# Patient Record
Sex: FEMALE | Race: ASIAN | Employment: FULL TIME | URBAN - METROPOLITAN AREA
[De-identification: names, ages, dates, MRNs, and addresses within clinical notes are randomized per-mention and may not be internally consistent; named-entity substitution may affect disease eponyms.]

---

## 2017-01-31 ENCOUNTER — OFFICE VISIT (OUTPATIENT)
Dept: FAMILY MEDICINE CLINIC | Facility: CLINIC | Age: 47
End: 2017-01-31

## 2017-01-31 VITALS
DIASTOLIC BLOOD PRESSURE: 78 MMHG | HEIGHT: 58.5 IN | OXYGEN SATURATION: 98 % | BODY MASS INDEX: 25.34 KG/M2 | TEMPERATURE: 98 F | HEART RATE: 75 BPM | SYSTOLIC BLOOD PRESSURE: 110 MMHG | RESPIRATION RATE: 16 BRPM | WEIGHT: 124 LBS

## 2017-01-31 DIAGNOSIS — G56.03 BILATERAL CARPAL TUNNEL SYNDROME: Primary | ICD-10-CM

## 2017-01-31 PROBLEM — E55.9 VITAMIN D DEFICIENCY: Status: ACTIVE | Noted: 2017-01-31

## 2017-01-31 PROBLEM — E78.5 HYPERLIPIDEMIA, UNSPECIFIED: Status: ACTIVE | Noted: 2017-01-31

## 2017-01-31 PROCEDURE — 99203 OFFICE O/P NEW LOW 30 MIN: CPT | Performed by: FAMILY MEDICINE

## 2017-01-31 RX ORDER — IBUPROFEN SODIUM 256 MG/1
400 TABLET, COATED ORAL EVERY 6 HOURS PRN
COMMUNITY

## 2017-01-31 RX ORDER — EVEROLIMUS 0.25 MG/1
TABLET ORAL
COMMUNITY

## 2017-01-31 NOTE — PROGRESS NOTES
[facility] Family Medicine Office Note  Chief Complaint:   Patient presents with:  Wrist Pain: rt wrist x 1.5 mos, intermittent, pins and needles b/l past 3 mos, right more than left, wonders if carpal tunnel      HPI:   This is a 50year old fem palpitations, edema, dyspnea on exertion or at rest.  RESPIRATORY:  Denies shortness of breath, wheezing, cough or sputum. GASTROINTESTINAL:  Denies abdominal pain, nausea, vomiting, constipation, diarrhea, or blood in stool.   MUSCULOSKELETAL:  Denies wea hepatosplenomegaly. BACK: No tenderness, no spasm. EXTREMITIES:  No edema, no cyanosis, no clubbing, FROM, 2+ dorsalis pedis pulses bilaterally, no tenderness on shoulders and elbows, mild tenderness both wrist, right more than left.   NEURO:  Normal gait

## 2017-01-31 NOTE — PATIENT INSTRUCTIONS
Understanding Carpal Tunnel Syndrome    The carpal tunnel is a narrow space inside the wrist. It is ringed by bone and a band of tough tissue called the transverse carpal ligament.  A major nerve called the median nerve runs from the forearm into the hand · Cortisone shots. Cortisone is a medicine that helps reduce swelling. It is injected directly into the wrist. It helps shrink tissues inside the carpal tunnel. This relieves symptoms for a time. · Pain medicines.  You may take over-the-counter or prescrip

## 2017-02-01 ENCOUNTER — TELEPHONE (OUTPATIENT)
Dept: FAMILY MEDICINE CLINIC | Facility: CLINIC | Age: 47
End: 2017-02-01

## 2017-02-01 NOTE — TELEPHONE ENCOUNTER
Pt saw Dr. Carmen Chamorro on 1/31/17 for carpal tunnel. She picked up a splint and is wondering if she pickup up the correct one. Please advise patient at [phone]. Thank you.

## 2017-02-04 ENCOUNTER — LAB ENCOUNTER (OUTPATIENT)
Dept: LAB | Age: 47
End: 2017-02-04
Attending: FAMILY MEDICINE
Payer: COMMERCIAL

## 2017-02-04 ENCOUNTER — OFFICE VISIT (OUTPATIENT)
Dept: FAMILY MEDICINE CLINIC | Facility: CLINIC | Age: 47
End: 2017-02-04

## 2017-02-04 VITALS
HEIGHT: 58.5 IN | HEART RATE: 77 BPM | RESPIRATION RATE: 16 BRPM | TEMPERATURE: 98 F | SYSTOLIC BLOOD PRESSURE: 100 MMHG | OXYGEN SATURATION: 99 % | BODY MASS INDEX: 25.54 KG/M2 | WEIGHT: 125 LBS | DIASTOLIC BLOOD PRESSURE: 66 MMHG

## 2017-02-04 DIAGNOSIS — Z13.1 SCREENING FOR DIABETES MELLITUS: ICD-10-CM

## 2017-02-04 DIAGNOSIS — Z12.4 SCREENING FOR CERVICAL CANCER: ICD-10-CM

## 2017-02-04 DIAGNOSIS — R74.8 ELEVATED LIVER ENZYMES: ICD-10-CM

## 2017-02-04 DIAGNOSIS — E55.9 VITAMIN D DEFICIENCY: ICD-10-CM

## 2017-02-04 DIAGNOSIS — Z00.01 ENCOUNTER FOR GENERAL ADULT MEDICAL EXAMINATION WITH ABNORMAL FINDINGS: ICD-10-CM

## 2017-02-04 DIAGNOSIS — R79.89 ELEVATED FERRITIN LEVEL: ICD-10-CM

## 2017-02-04 DIAGNOSIS — E78.00 PURE HYPERCHOLESTEROLEMIA: ICD-10-CM

## 2017-02-04 DIAGNOSIS — N91.2 ABSENCE OF MENSTRUATION: ICD-10-CM

## 2017-02-04 DIAGNOSIS — Z12.31 ENCOUNTER FOR SCREENING MAMMOGRAM FOR BREAST CANCER: ICD-10-CM

## 2017-02-04 DIAGNOSIS — Z00.01 ENCOUNTER FOR GENERAL ADULT MEDICAL EXAMINATION WITH ABNORMAL FINDINGS: Primary | ICD-10-CM

## 2017-02-04 DIAGNOSIS — K76.0 FATTY LIVER: ICD-10-CM

## 2017-02-04 DIAGNOSIS — Z13.0 SCREENING FOR DEFICIENCY ANEMIA: ICD-10-CM

## 2017-02-04 DIAGNOSIS — Z13.29 SCREENING FOR THYROID DISORDER: ICD-10-CM

## 2017-02-04 DIAGNOSIS — Z13.89 SCREENING FOR GENITOURINARY CONDITION: ICD-10-CM

## 2017-02-04 DIAGNOSIS — Z12.11 SCREENING FOR COLON CANCER: ICD-10-CM

## 2017-02-04 DIAGNOSIS — Z80.0 FAMILY HISTORY OF COLON CANCER: ICD-10-CM

## 2017-02-04 DIAGNOSIS — G56.03 BILATERAL CARPAL TUNNEL SYNDROME: ICD-10-CM

## 2017-02-04 LAB
25-HYDROXYVITAMIN D (TOTAL): 16.2 NG/ML (ref 30–100)
ALBUMIN SERPL-MCNC: 4.2 G/DL (ref 3.5–4.8)
ALP LIVER SERPL-CCNC: 89 U/L (ref 39–100)
ALT SERPL-CCNC: 64 U/L (ref 14–54)
APPEARANCE: CLEAR
AST SERPL-CCNC: 37 U/L (ref 15–41)
BASOPHILS # BLD AUTO: 0.05 X10(3) UL (ref 0–0.1)
BASOPHILS NFR BLD AUTO: 0.9 %
BILIRUB SERPL-MCNC: 0.7 MG/DL (ref 0.1–2)
BILIRUBIN: NEGATIVE
BUN BLD-MCNC: 10 MG/DL (ref 8–20)
CALCIUM BLD-MCNC: 8.7 MG/DL (ref 8.3–10.3)
CHLORIDE: 102 MMOL/L (ref 101–111)
CHOLEST SMN-MCNC: 248 MG/DL (ref ?–200)
CO2: 32 MMOL/L (ref 22–32)
CONTROL LINE PRESENT WITH A CLEAR BACKGROUND (YES/NO): YES YES/NO
CREAT BLD-MCNC: 0.7 MG/DL (ref 0.55–1.02)
DEPRECATED HBV CORE AB SER IA-ACNC: 320.3 NG/ML (ref 10–291)
EOSINOPHIL # BLD AUTO: 0.12 X10(3) UL (ref 0–0.3)
EOSINOPHIL NFR BLD AUTO: 2.2 %
ERYTHROCYTE [DISTWIDTH] IN BLOOD BY AUTOMATED COUNT: 11.8 % (ref 11.5–16)
GAMMA GLUTAMYL TRANSFERASE: 76 U/L (ref 5–55)
GLUCOSE (URINE DIPSTICK): NEGATIVE MG/DL
GLUCOSE BLD-MCNC: 69 MG/DL (ref 70–99)
HCT VFR BLD AUTO: 44.4 % (ref 34–50)
HDLC SERPL-MCNC: 61 MG/DL (ref 45–?)
HDLC SERPL: 4.07 {RATIO} (ref ?–4.44)
HGB BLD-MCNC: 15 G/DL (ref 12–16)
IMMATURE GRANULOCYTE COUNT: 0.01 X10(3) UL (ref 0–1)
IMMATURE GRANULOCYTE RATIO %: 0.2 %
IRON SATURATION: 34 % (ref 13–45)
IRON: 135 UG/DL (ref 28–170)
KETONES (URINE DIPSTICK): NEGATIVE MG/DL
LDLC SERPL CALC-MCNC: 155 MG/DL (ref ?–130)
LEUKOCYTES: NEGATIVE
LYMPHOCYTES # BLD AUTO: 1.5 X10(3) UL (ref 0.9–4)
LYMPHOCYTES NFR BLD AUTO: 26.9 %
M PROTEIN MFR SERPL ELPH: 8.3 G/DL (ref 6.1–8.3)
MCH RBC QN AUTO: 32.5 PG (ref 27–33.2)
MCHC RBC AUTO-ENTMCNC: 33.8 G/DL (ref 31–37)
MCV RBC AUTO: 96.3 FL (ref 81–100)
MONOCYTES # BLD AUTO: 0.32 X10(3) UL (ref 0.1–0.6)
MONOCYTES NFR BLD AUTO: 5.7 %
MULTISTIX LOT#: NORMAL NUMERIC
NEUTROPHIL ABS PRELIM: 3.57 X10 (3) UL (ref 1.3–6.7)
NEUTROPHILS # BLD AUTO: 3.57 X10(3) UL (ref 1.3–6.7)
NEUTROPHILS NFR BLD AUTO: 64.1 %
NITRITE, URINE: NEGATIVE
NONHDLC SERPL-MCNC: 187 MG/DL (ref ?–130)
PH, URINE: 5.5 (ref 4.5–8)
PLATELET # BLD AUTO: 407 10(3)UL (ref 150–450)
POTASSIUM SERPL-SCNC: 3.6 MMOL/L (ref 3.6–5.1)
PREGNANCY TEST, URINE: NEGATIVE
PROTEIN (URINE DIPSTICK): NEGATIVE MG/DL
RBC # BLD AUTO: 4.61 X10(6)UL (ref 3.8–5.1)
RED CELL DISTRIBUTION WIDTH-SD: 41.1 FL (ref 35.1–46.3)
SODIUM SERPL-SCNC: 138 MMOL/L (ref 136–144)
SPECIFIC GRAVITY: 1.02 (ref 1–1.03)
TOTAL IRON BINDING CAPACITY: 395 UG/DL (ref 298–536)
TRANSFERRIN: 265 MG/DL (ref 200–360)
TRANSFERRIN: 265 MG/DL (ref 200–360)
TRIGLYCERIDES: 159 MG/DL (ref ?–150)
TSI SER-ACNC: 2.43 MIU/ML (ref 0.35–5.5)
URINE-COLOR: YELLOW
UROBILINOGEN,SEMI-QN: 0.2 MG/DL (ref 0–1.9)
VLDL: 32 MG/DL (ref 5–40)
WBC # BLD AUTO: 5.6 X10(3) UL (ref 4–13)

## 2017-02-04 PROCEDURE — 99396 PREV VISIT EST AGE 40-64: CPT | Performed by: FAMILY MEDICINE

## 2017-02-04 PROCEDURE — 84443 ASSAY THYROID STIM HORMONE: CPT

## 2017-02-04 PROCEDURE — 85025 COMPLETE CBC W/AUTO DIFF WBC: CPT

## 2017-02-04 PROCEDURE — 80061 LIPID PANEL: CPT

## 2017-02-04 PROCEDURE — 84466 ASSAY OF TRANSFERRIN: CPT

## 2017-02-04 PROCEDURE — 83540 ASSAY OF IRON: CPT

## 2017-02-04 PROCEDURE — 82728 ASSAY OF FERRITIN: CPT

## 2017-02-04 PROCEDURE — 83550 IRON BINDING TEST: CPT

## 2017-02-04 PROCEDURE — 81025 URINE PREGNANCY TEST: CPT | Performed by: FAMILY MEDICINE

## 2017-02-04 PROCEDURE — 81003 URINALYSIS AUTO W/O SCOPE: CPT | Performed by: FAMILY MEDICINE

## 2017-02-04 PROCEDURE — 36415 COLL VENOUS BLD VENIPUNCTURE: CPT

## 2017-02-04 PROCEDURE — 80053 COMPREHEN METABOLIC PANEL: CPT

## 2017-02-04 PROCEDURE — 82977 ASSAY OF GGT: CPT

## 2017-02-04 PROCEDURE — 82306 VITAMIN D 25 HYDROXY: CPT

## 2017-02-04 PROCEDURE — 87624 HPV HI-RISK TYP POOLED RSLT: CPT | Performed by: FAMILY MEDICINE

## 2017-02-04 NOTE — PATIENT INSTRUCTIONS
Prevention Guidelines, Women Ages 36 to 52  Screening tests and vaccines are an important part of managing your health. Health counseling is essential, too. Below are guidelines for these, for women ages 36 to 52.  Talk with your healthcare provider to ma Syphilis Women at increased risk for infection – talk with your healthcare provider At routine exams   Tuberculosis Women at increased risk for infection – talk with your healthcare provider Ask your healthcare provider   Vision All women in this age group Breast cancer and chemoprevention Women at high risk for breast cancer When your risk is known   Diet and exercise Women who are overweight or obese When diagnosed, and then at routine exams   Domestic violence Women at the age in which they are able to ha A healthy liver may contain some fat. But if too much fat builds up in the liver, this causes NAFLD. NAFLD can be mild, causing fatty liver. Or it can be more severe and show inflammation, as well as the fat, and cause non-alcoholic steatohepatitis (PHAN). Treating NAFLD  Treatment for NAFLD varies for each person. Your doctor will monitor your health and treat any symptoms or underlying health problems you have.  Your doctor will also work with you to control your risk factors so that damage to your liver is · HDL (high-density lipoprotein) is known as \"good cholesterol. \" This protein shell collects excess cholesterol that LDLs have left behind on blood vessel walls.  That's why high levels of HDL cholesterol can decrease your risk of heart disease and stroke · Take medication as directed. Many people need medication to get their LDL levels to a safe level. Medication to lower cholesterol levels is effective and safe.  (But taking medication is not a substitute for exercise or watching your diet!) Your doctor ca · Having chronic kidney disease  · Have dark skin pigmentation  · Being a  baby  Vitamin D has many effects in the body.  You may need this test to help your provider diagnose or treat:  · Problems with the parathyroid gland  · Cancer  · Autoimmune The amount of time you spend in the sunlight, your diet, and whether you take vitamin D in supplement form can affect your vitamin D levels. Ask your healthcare provider if any health conditions you have or medicines you take could affect your results.   Avni Rachel High levels of ferritin can damage your joints, heart, liver, and pancreas. Too much iron is most often caused by an inherited disease called hemochromatosis.  Many people with this disease never have any symptoms, especially women who lose iron through men · Zinc protoporphyrin to measure iron deficiency or levels of lead toxicity. What do my test results mean? Many things may affect your lab test results.  These include the method each lab uses to do the test. Even if your test results are different from t Eating foods that are high in iron, including meat, leafy green vegetables, and beans, or taking iron supplements can affect your results. Drinking a lot of milk, donating blood frequently, and running long distances regularly can also affect your results. Many things may affect your lab test results. These include the method each lab uses to do the test. Even if your test results are different from the normal value, you may not have a problem.  To learn what the results mean for you, talk with your healthcar You must not eat or drink anything but water for 8 hours before having this test. Be sure your healthcare provider knows about all medicines, herbs, vitamins, and supplements you are taking.  This includes medicines that don't need a prescription and any il As a woman ages, her ovaries begin to make hormones less regularly. In some months, there may not be ovulation. Without ovulation, progesterone isn't secreted so a normal period doesn't occur. The menstrual cycle will be harder to predict.  Over time, the o © 0769-4892 [address], [address]. All rights reserved. This information is not intended as a substitute for professional medical care. Always follow your healthcare professional's instructions.

## 2017-02-04 NOTE — PROGRESS NOTES
CC: Annual Physical Exam    HPI:   Renaldo Bryant is a 50year old female who presents for a complete physical exam. Symptoms: denies discharge, itching, burning or dysuria. Last mammogram 2 yrs ago.  Never had an abnormal mammogram. Brought in previous labs History   Problem Relation Age of Onset   • Diabetes Father    • Cancer Mother 47     colorectal cancer   • Cancer Maternal Grandmother      metastatatic cancer   • Cancer Maternal Grandfather      liver cancer      Social History:     Smoking Status: Francine Correa index is 25.68 kg/(m^2) as calculated from the following:    Height as of this encounter: 58.5\". Weight as of this encounter: 125 lb. Vital signs reviewed. Appears stated age, well groomed.   Physical Exam:  GEN:  Patient is alert, awake and oriented, Request  Ferritin [E]  Iron And Tibc [E]  GGT (Gamma Glutamyl Transpeptidase) [E]  Transferrin [E]  Urine Preg Test    1.  Encounter for general adult medical examination with abnormal findings  Exam as noted, declined STI testing since not sexually active, Transferrin [E]; Future    13. Vitamin D deficiency  Recheck. - Vitamin D, 25-Hydroxy [E]; Future    14. Absence of menstruation  Pregnancy test negative, likely perimenopausal, monitor.  - Urine Preg Test    15.  Elevated liver enzymes  Likely due to fatt

## 2017-02-05 PROBLEM — E78.2 MIXED HYPERLIPIDEMIA: Status: ACTIVE | Noted: 2017-01-31

## 2017-02-10 ENCOUNTER — PATIENT MESSAGE (OUTPATIENT)
Dept: FAMILY MEDICINE CLINIC | Facility: CLINIC | Age: 47
End: 2017-02-10

## 2017-02-10 LAB — HPV I/H RISK 1 DNA SPEC QL NAA+PROBE: NEGATIVE

## 2017-02-11 ENCOUNTER — HOSPITAL ENCOUNTER (OUTPATIENT)
Dept: MAMMOGRAPHY | Age: 47
Discharge: HOME | End: 2017-02-11
Attending: FAMILY MEDICINE
Payer: COMMERCIAL

## 2017-02-11 DIAGNOSIS — Z12.31 ENCOUNTER FOR SCREENING MAMMOGRAM FOR BREAST CANCER: ICD-10-CM

## 2017-02-11 DIAGNOSIS — Z00.01 ENCOUNTER FOR GENERAL ADULT MEDICAL EXAMINATION WITH ABNORMAL FINDINGS: ICD-10-CM

## 2017-02-11 PROCEDURE — 77067 SCR MAMMO BI INCL CAD: CPT

## 2017-02-14 ENCOUNTER — OFFICE VISIT (OUTPATIENT)
Dept: HEMATOLOGY/ONCOLOGY | Facility: HOSPITAL | Age: 47
End: 2017-02-14
Attending: INTERNAL MEDICINE
Payer: COMMERCIAL

## 2017-02-14 ENCOUNTER — TELEPHONE (OUTPATIENT)
Dept: FAMILY MEDICINE CLINIC | Facility: CLINIC | Age: 47
End: 2017-02-14

## 2017-02-14 VITALS
TEMPERATURE: 99 F | OXYGEN SATURATION: 97 % | HEIGHT: 58.5 IN | RESPIRATION RATE: 18 BRPM | BODY MASS INDEX: 25.95 KG/M2 | SYSTOLIC BLOOD PRESSURE: 136 MMHG | HEART RATE: 91 BPM | DIASTOLIC BLOOD PRESSURE: 85 MMHG | WEIGHT: 127 LBS

## 2017-02-14 DIAGNOSIS — R79.89 ELEVATED FERRITIN: Primary | ICD-10-CM

## 2017-02-14 PROCEDURE — 99243 OFF/OP CNSLTJ NEW/EST LOW 30: CPT | Performed by: INTERNAL MEDICINE

## 2017-02-14 NOTE — CONSULTS
[facility]cer Center Report of Consultation    Patient Name: Neal Del Castillo   YOB: 1970   Medical Record Number: [id number]   CSN: [id number]   Consulting Physician: Jose Stern MD  Referring Physician(s): No ref.  provider found  Date of Co daily    Exercise No    Seat Belt Yes     Social History Narrative     She is a single parent. She has 2 children in high school, a sophomore and a senior. She works in an office. Allergies:      Thimerosal                  Comment:rash    Current Medi are present. No edema. Neurological: Grossly intact. Labs:         Assessment and Plan:    # Elevated Ferritin: 50year old premenopausal patient that has been referred for elevated ferritin. Labs also showed elevated liver enzymes.   At this time, s

## 2017-02-14 NOTE — PROGRESS NOTES
Pt here for MD consult- referred by Dr. Raphael Parekh for elevated Ferritin level. Pt states she feels well, occasionally is fatigue and has some intermittent dizziness a couple days a week for the last month. Denies SOB/RUIZ.  States she is currently on Vit D 50

## 2017-02-14 NOTE — TELEPHONE ENCOUNTER
Pt dropped off a form to be filled out regarding her physical. Please sign \"2017 Wellness Program Biometric Screening Acknowledgement Form\" and pt will pick it up in person. If possible, please email pt when it is ready. Court [email or web address].  The cell

## 2017-02-17 NOTE — TELEPHONE ENCOUNTER
Call to pt-advised of need for ofc waist circumference measurement. Pt voices understanding-scheduled nurse visit for 2/21/17 [id number]. Pt st also needs something in writing with her dx of carpal tunnel syndrome-to submit to insurance.   Discussed printing A

## 2017-02-21 ENCOUNTER — NURSE ONLY (OUTPATIENT)
Dept: FAMILY MEDICINE CLINIC | Facility: CLINIC | Age: 47
End: 2017-02-21

## 2017-03-03 ENCOUNTER — OFFICE VISIT (OUTPATIENT)
Dept: FAMILY MEDICINE CLINIC | Facility: CLINIC | Age: 47
End: 2017-03-03

## 2017-03-03 VITALS
DIASTOLIC BLOOD PRESSURE: 64 MMHG | TEMPERATURE: 98 F | OXYGEN SATURATION: 97 % | HEART RATE: 85 BPM | WEIGHT: 129 LBS | SYSTOLIC BLOOD PRESSURE: 104 MMHG | RESPIRATION RATE: 16 BRPM | HEIGHT: 58.5 IN | BODY MASS INDEX: 26.35 KG/M2

## 2017-03-03 DIAGNOSIS — R79.89 ELEVATED FERRITIN LEVEL: ICD-10-CM

## 2017-03-03 DIAGNOSIS — E16.1 FASTING HYPOGLYCEMIA: ICD-10-CM

## 2017-03-03 DIAGNOSIS — G56.03 BILATERAL CARPAL TUNNEL SYNDROME: Primary | ICD-10-CM

## 2017-03-03 DIAGNOSIS — E55.9 VITAMIN D DEFICIENCY: ICD-10-CM

## 2017-03-03 DIAGNOSIS — R74.8 ELEVATED LIVER ENZYMES: ICD-10-CM

## 2017-03-03 DIAGNOSIS — K76.0 FATTY LIVER: ICD-10-CM

## 2017-03-03 DIAGNOSIS — E78.2 MIXED HYPERLIPIDEMIA: ICD-10-CM

## 2017-03-03 PROCEDURE — 99214 OFFICE O/P EST MOD 30 MIN: CPT | Performed by: FAMILY MEDICINE

## 2017-03-03 NOTE — PATIENT INSTRUCTIONS
Vitamin D  Does this test have other names? 25-hydroxyvitamin D (25-high-DROX-ee-VIE-tuh-min D), 25(OH)D  What is this test?  Vitamin D is mainly found in fortified dairy foods, juice, breakfast cereal, and certain fish.  This vitamin plays many roles in A result for a lab test may be affected by many things, including the method the laboratory uses to do the test. If your test results are different from the normal value, you may not have a problem.  To learn what the results mean for you, talk with your he Date Last Reviewed: 10/12/2015  © 4324-6054 [address], [address] [place], [place] [address]. All rights reserved. This information is not intended as a substitute for professional medical care.  Always follow your healthcare Darius Cifuentes · Cut back on saturated fats and trans (also called hydrogenated) fats by selecting lean cuts of meat, low-fat dairy, and using oils instead of solid fats. Limit baked goods, processed meats, and fried foods.  A diet that’s high in these fats increases your © 2585-2151 [address], [address] [place]. All rights reserved. This information is not intended as a substitute for professional medical care. Always follow your healthcare professional's instructions.         Jung Crain Your healthcare provider may recommend that you get more physical activity if you haven't been active. Your provider may recommend that you get moderate to vigorous physical activity for at least 40 minutes each day.  You should do this for at least 3 to 4 Healthy eating and exercise are a good start to keeping your cholesterol down. But you may need some extra help from medicine. If your doctor prescribes medicine, be sure to take it exactly as directed.  Remember:  · Tell your healthcare provider about all If you are in a high-risk group, talk with your healthcare provider about your treatment goals. Make sure you understand why these goals are important, based on your own health history and your family history of heart disease or high cholesterol.   Make a p Give your hands a break from time to time with a rest. Even a few minutes once an hour can help. Reduce speed and force  Slow down the speed in which you do a forceful, repetitive motion. This gives your wrist time to recover from the effort.  Use power to A healthy liver may contain some fat. But if too much fat builds up in the liver, this causes NAFLD. NAFLD can be mild, causing fatty liver. Or it can be more severe and show inflammation, as well as the fat, and cause non-alcoholic steatohepatitis (PHAN). Treating NAFLD  Treatment for NAFLD varies for each person. Your doctor will monitor your health and treat any symptoms or underlying health problems you have.  Your doctor will also work with you to control your risk factors so that damage to your liver is

## 2017-03-03 NOTE — PROGRESS NOTES
[facility] Family Medicine Office Note  Chief Complaint:   Patient presents with: Follow - Up: carpal tunnel       HPI:   This is a 50year old female coming in for f/up on carpal tunnel syndrome. States no more pain, no pins/needles at night.  H Transferrin 265 200-360 mg/dL   Total Iron Binding Capacity 395 298-536 ug/dL   Iron Saturation 34 13-45 %   -GGT (GAMMA GLUTAMYL TRANSPEPTIDASE)   Result Value Ref Range   Gamma Glutamyl Transferase 76 (H) 5-55 U/L   -TRANSFERRIN   Result Value Ref Rang total) by mouth once a week. Disp: 8 capsule Rfl: 0   ADVIL 200 MG Oral Tab Take 400 mg by mouth every 6 (six) hours as needed for Pain. Disp:  Rfl:    CENTRUM [employer]IST [employer]ERGY Oral Tab Take by mouth.  Disp:  Rfl:          REVIEW OF SYSTEMS:   CONSTITUTION Eyes: EOMI, PERRLA, no scleral icterus, conjunctivae clear bilaterally, no eye discharge Ears: External normal. Nose: patent, no nasal discharge Throat:  No erythema or exudate. Mouth:  No oral lesions or ulcerations, good dentition.   NECK: Supple, no CLA complications from the treatments as a result of today. Lab results, including pap and mammo discussed with patient as above. Return in about 1 year (around 3/3/2018), or if symptoms worsen or fail to improve, for Annual physical, fasting.     Cristina Coburn

## 2017-04-07 RX ORDER — ERGOCALCIFEROL 1.25 MG/1
CAPSULE ORAL
Qty: 4 CAPSULE | Refills: 0 | OUTPATIENT
Start: 2017-04-07

## 2017-04-07 NOTE — TELEPHONE ENCOUNTER
Not protocol medication. LOV 3-3-17. Next appointment not yet made. Please refill Rx if appropriate or advise.      5. Vitamin D deficiency  May have OTC vitamin D 2000 u/d or 5000 u 3x/d after done with Rx vitamin D, discussed importance of vitamin D.

## 2018-05-24 ENCOUNTER — PATIENT OUTREACH (OUTPATIENT)
Dept: FAMILY MEDICINE CLINIC | Facility: CLINIC | Age: 48
End: 2018-05-24

## 2018-05-25 NOTE — PROGRESS NOTES
Please call pt to inquire if pt has had a colonoscopy in the last 10 years and if so who performed it (name and phone #). Please let Katherin Agosto know so I can obtain the report.     If pt did not have a colonoscopy please advise them we will leave the FIT stoo

## 2019-01-29 ENCOUNTER — PATIENT OUTREACH (OUTPATIENT)
Dept: FAMILY MEDICINE CLINIC | Facility: CLINIC | Age: 49
End: 2019-01-29

## 2019-01-29 NOTE — PROGRESS NOTES
Noted in patient's chart she had a colonoscopy in 2015 and needs to repeat in 5 years. Can we find out who completed colonoscopy and obtain record of this so we can update her chart. Also- patient's LOV 03/2017 with Dr. Carmen Chamorro. PCP listed as Dr. Dayan Tang.

## 2019-05-21 NOTE — PROGRESS NOTES
Noted in patient's chart she had a colonoscopy in 2015 and needs to repeat in 5 years. Can we find out who completed colonoscopy and obtain record of this so we can update her chart.     Also- patient's LOV 03/2017 with Dr. Hopson Comment.  PCP listed a

## 2019-10-09 ENCOUNTER — PATIENT OUTREACH (OUTPATIENT)
Dept: FAMILY MEDICINE CLINIC | Facility: CLINIC | Age: 49
End: 2019-10-09

## 2019-10-09 NOTE — PROGRESS NOTES
Patient previously patient of Dr. Quan [place] listed as Dr. Tariq Brannon. Patient is due for annual physical and routine health maintenance- colon cancer screening and mammogram. Please have patient schedule appt with Dr. Tariq Brannon.  If she is under the care of law

## 2020-03-11 ENCOUNTER — PATIENT OUTREACH (OUTPATIENT)
Dept: FAMILY MEDICINE CLINIC | Facility: CLINIC | Age: 50
End: 2020-03-11

## 2020-03-11 NOTE — PROGRESS NOTES
Patient previously patient of Dr. Maria C Robledo listed as Dr. Celena Maria. Patient is due for annual physical and routine health maintenance- colon cancer screening and mammogram. Please have patient schedule appt with Dr. Celena Maria.  If she is under the care of law

## 2020-06-02 NOTE — TELEPHONE ENCOUNTER
From: Harris Ross  To: Remy Hearn MD  Sent: 2/10/2017 9:12 AM CST  Subject: Test Results Question    Hello again. Is my Pap Smear result OK? I saw something like \"with abnormal findings\" on one line.  Thanks in advance for your input. -Lupis Rotator Cuff Tendinitis    Rotator cuff tendinitis is inflammation of the tough, cord-like bands that connect muscle to bone (tendons) in the rotator cuff. The rotator cuff includes all of the muscles and tendons that connect the arm to the shoulder. The rotator cuff holds the head of the upper arm bone (humerus) in the cup (fossa) of the shoulder blade (scapula).  This condition can lead to a long-lasting (chronic) tear. The tear may be partial or complete.  What are the causes?  This condition is usually caused by overusing the rotator cuff.  What increases the risk?  This condition is more likely to develop in athletes and workers who frequently use their shoulder or reach over their heads. This can include activities such as:  · Tennis.  · Baseball or softball.  · Swimming.  · Construction work.  · Painting.  What are the signs or symptoms?  Symptoms of this condition include:  · Pain spreading (radiating) from the shoulder to the upper arm.  · Swelling and tenderness in front of the shoulder.  · Pain when reaching, pulling, or lifting the arm above the head.  · Pain when lowering the arm from above the head.  · Minor pain in the shoulder when resting.  · Increased pain in the shoulder at night.  · Difficulty placing the arm behind the back.  How is this diagnosed?  This condition is diagnosed with a medical history and physical exam. Tests may also be done, including:  · X-rays.  · MRI.  · Ultrasounds.  · CT or MR arthrogram. During this test, a contrast material is injected and then images are taken.  How is this treated?  Treatment for this condition depends on the severity of the condition. In less severe cases, treatment may include:  · Rest. This may be done with a sling that holds the shoulder still (immobilization). Your health care provider may also recommend avoiding activities that involve lifting your arm over your head.  · Icing the shoulder.  · Anti-inflammatory medicines, such as aspirin or  ibuprofen.  In more severe cases, treatment may include:  · Physical therapy.  · Steroid injections.  · Surgery.  Follow these instructions at home:  If you have a sling:  · Wear the sling as told by your health care provider. Remove it only as told by your health care provider.  · Loosen the sling if your fingers tingle, become numb, or turn cold and blue.  · Keep the sling clean.  · If the sling is not waterproof, do not let it get wet. Remove it, if allowed, or cover it with a watertight covering when you take a bath or shower.  Managing pain, stiffness, and swelling  · If directed, put ice on the injured area.  ? If you have a removable sling, remove it as told by your health care provider.  ? Put ice in a plastic bag.  ? Place a towel between your skin and the bag.  ? Leave the ice on for 20 minutes, 2-3 times a day.  · Move your fingers often to avoid stiffness and to lessen swelling.  · Raise (elevate) the injured area above the level of your heart while you are lying down.  · Find a comfortable sleeping position or sleep on a recliner, if available.  Driving  · Do not drive or use heavy machinery while taking prescription pain medicine.  · Ask your health care provider when it is safe to drive if you have a sling on your arm.  Activity  · Rest your shoulder as told by your health care provider.  · Return to your normal activities as told by your health care provider. Ask your health care provider what activities are safe for you.  · Do any exercises or stretches as told by your health care provider.  · If you do repetitive overhead tasks, take small breaks in between and include stretching exercises as told by your health care provider.  General instructions  · Do not use any products that contain nicotine or tobacco, such as cigarettes and e-cigarettes. These can delay healing. If you need help quitting, ask your health care provider.  · Take over-the-counter and prescription medicines only as told by your  health care provider.  · Keep all follow-up visits as told by your health care provider. This is important.  Contact a health care provider if:  · Your pain gets worse.  · You have new pain in your arm, hands, or fingers.  · Your pain is not relieved with medicine or does not get better after 6 weeks of treatment.  · You have cracking sensations when moving your shoulder in certain directions.  · You hear a snapping sound after using your shoulder, followed by severe pain and weakness.  Get help right away if:  · Your arm, hand, or fingers are numb or tingling.  · Your arm, hand, or fingers are swollen or painful or they turn white or blue.  Summary  · Rotator cuff tendinitis is inflammation of the tough, cord-like bands that connect muscle to bone (tendons) in the rotator cuff.  · This condition is usually caused by overusing the rotator cuff, which includes all of the muscles and tendons that connect the arm to the shoulder.  · This condition is more likely to develop in athletes and workers who frequently use their shoulder or reach over their heads.  · Treatment generally includes rest, anti-inflammatory medicines, and icing. In some cases, physical therapy and steroid injections may be needed. In severe cases, surgery may be needed.  This information is not intended to replace advice given to you by your health care provider. Make sure you discuss any questions you have with your health care provider.  Document Released: 03/09/2005 Document Revised: 04/10/2020 Document Reviewed: 12/04/2017  Elsevier Patient Education © 2020 [employer]

## (undated) NOTE — MR AVS SNAPSHOT
After Visit Summary   2/14/2017    Tan Obando    MRN: [id number]           Diagnoses this Visit     Elevated ferritin    -  Primary       Allergies     Thimerosal     rash      Your Vital Signs Were     BP Pulse Temp(Src) Resp    136/85 m

## (undated) NOTE — LETTER
6/12/2018    Zuleima Subramanian 9    Dear Ms. Royce Key,     [address] [facility] office has been trying to address important healthcare needs. It is important that you contact our office at your earliest convenience.     Also,  Did

## (undated) NOTE — MR AVS SNAPSHOT
[facility] 37, [address] [facility]  [phone]               Thank you for choosing us for your health care visit with Jack Siegel MD.  We are glad to serve you and happy to provide you with this summ Why do I need this test?  Vitamin D testing has become much more popular in recent years. Your healthcare provider may check your vitamin D levels to find out if you have any risks to bone health.  These might be:  · Low calcium  · Soft bones caused by low Above-normal levels may be a sign that you're taking too much in supplement form.   How is this test done? The test requires a blood sample, which is drawn through a needle from a vein in your arm. Does this test pose any risks?   Taking a blood sample wi There are two main kinds of lipoproteins:  · LDL (low-density lipoprotein) is known as \"bad cholesterol. \" It mainly carries cholesterol. It delivers this cholesterol to body cells. Excess LDL cholesterol will build up in artery walls.  This increases your lowers your risk for heart disease and stroke. · Weight management. If you are overweight or obese, your health care provider will work with you to lose weight and lower your BMI (body mass index) to a normal or near-normal level.  Making diet changes and · Limiting how often you eat out  Getting exercise  Regular exercise is a good way to help your body control cholesterol. Regular exercise can help in many ways.  It can:  · Raise your good cholesterol  · Help lower your bad cholesterol  · Let blood flow be · Costs a lot of money  Controlling stress   Learn ways to control stress. This will help you deal with stress in your home and work life. Controlling stress can greatly lower your risk of getting cardiovascular disease.   Making the most of medicines  Heal heart attack or stroke and have an LDL cholesterol level of 70 to 189 mg/dL  · Adults who are 24years old or older and have an LDL cholesterol level of 190 mg/dL or higher.   If you are in a high-risk group, talk with your healthcare provider about your tr periods of time. Even simple, light tasks can cause injury this way. Instead, alternate tasks or switch hands. Rest your hands  Give your hands a break from time to time with a rest. Even a few minutes once an hour can help.   Reduce speed and force  Slow non-alcoholic steatohepatitis (PHAN). PHAN can lead to cirrhosis. With fatty liver, the liver simply contains more fat than normal. This extra fat usually causes no damage to the liver. With PHAN, the fatty liver becomes inflamed over time.  PHAN is serious doctor will also work with you to control your risk factors so that damage to your liver is less likely.  Your plan may include:  · Losing excess weight  · Getting regular exercise  · Controlling diabetes and high cholesterol or triglyceride levels  · [facility] office, you can view your past visit information in [facility] by going to Visits < Visit Summaries. [facility] questions? Call [phone] for help. [facility] is NOT to be used for urgent needs. For medical emergencies, dial 911.            Visit [facility]

## (undated) NOTE — MR AVS SNAPSHOT
[facility] 37, [address] [facility]  [phone]               Thank you for choosing us for your health care visit with Corinna Bear MD.  We are glad to serve you and happy to provide you with this summ Blood pressure All women in this age group Every 2 years if your blood pressure is less than 120/80 mm Hg; yearly if your systolic blood pressure is 120 to 139 mm Hg, or your diastolic blood pressure reading is 80 to 89 mm Hg   Breast cancer All women in t healthcare provider 2 doses given 6 months apart   Hepatitis B Women at increased risk for infection – talk with your healthcare provider 3 doses over 6 months; second dose should be given 1 month after the first dose; the third dose should be given at [place] [address] © 0278-8166 The [address] [place] [address], [address] [place]. All rights reserved. This information is not intended as a substitute for professional medical care. Always follow your healthcare professional's instructions.         Non-Alc · Exposure to certain medications   Symptoms of NAFLD  Most people with NAFLD have no symptoms.  If symptoms do occur, they can include:  · Tiredness  · Weakness  · Weight loss  · Loss of appetite  · Nausea and vomiting  · Abdominal pain and cramping  · Yel substitute for professional medical care. Always follow your healthcare professional's instructions. Controlling Your Cholesterol  Cholesterol is a waxy substance. It travels in your blood through the blood vessels.  When you have high cholesterol, i · Eat more whole grains and soluble fiber (such as oat bran). These lower overall cholesterol. · Be active:  · Choose an activity you enjoy. Walking, swimming, and riding a bike are some good ways to be active.   · Start at a level where you feel comfortab particularly important for bone health. Vitamin D has many additional roles in the body. Vitamin D comes in several forms. When ultraviolet light, such as sunlight, hits your skin, it creates vitamin D3. D2 is used to fortify dairy foods.  Both of these ar ng/mL. Recommended daily amounts range from 400 to 800 international units (IU) per day based on your age.   Levels lower than normal can mean you are:  · Not making enough vitamin D on your own  · Not getting enough vitamin D in your diet  · Not absorbing Ferritin is a protein that stores iron. Red blood cells need iron to form normally and carry oxygen around your body. Other parts of your body, such as your liver, bone marrow, and muscles, also need iron.   Low levels of ferritin lead to iron-deficiency an · Brittle nails or loss of hair  · Pounding or \"whooshing\" sound in your ears  Children who eat a lot of ice, and toddlers and babies who drink too much whole cow's milk may also get this test.  You might also have this test done to check your ferritin l If your results are higher, it may mean you have:  · Hyperthyroidism  · Hemochromatosis  · Liver disease  · Inflammatory diseases  · Cancer, such as leukemia, lymphoma, or breast carcinoma  You may also have higher levels if you are getting iron-replacemen liver damage. One symptom of liver damage is jaundice, a yellowish tint to your skin and eyes. You may also need this test to see if you have liver or bone disease. This test is also used to look for chronic alcohol abuse.   What other tests might I have al Taking a blood sample with a needle carries risks that include bleeding, infection, bruising, or feeling dizzy. When the needle pricks your arm, you may feel a slight stinging sensation or pain. Afterward, the site may be slightly sore.   What might affect Progesterone levels start to go up. If the egg is not fertilized, progesterone levels go down. This causes the lining in the uterus to be shed. This is the bleeding that is your period.   Changes in hormones  As a woman ages, her ovaries begin to make hormo © 4504-1072 The [address] [place] [address], [address] [place]. All rights reserved. This information is not intended as a substitute for professional medical care. Always follow your healthcare professional's instructions.              Fo ThinPrep PAP with HPV Reflex Request    Complete by: Feb 04, 2017    Assoc Dx:  Screening for cervical cancer [Z12.4]           Ferritin [E]    Complete by:   Feb 04, 2017 (Approximate)    Assoc Dx:  Elevated ferritin level [R79.89]           Iron A Instructions: To schedule an appointment for your radiology test please call Roxana Rashid 84 Scheduling at [phone].          Referral Orders      Normal Orders This Visit    GASTRO - INTERNAL [[id number] CUSTOM]  Order #:  [id number]         **RE schedule your appointment. Failure to obtain required authorization numbers can create reimbursement difficulties for you.     Assoc Dx:  Encounter for general adult medical examination with abnormal findings [Z00.01], Screening for colon cancer [Z12.11], F Support Staff. Remember, MyChart is NOT to be used for urgent needs. For medical emergencies, dial 911.            Visit [facility] online at  [email or web address]

## (undated) NOTE — MR AVS SNAPSHOT
[facility] 37, [address] [address] [phone]               Thank you for choosing us for your health care visit with [facility].   We are glad to serve you and happy to provide you with this summary of yo

## (undated) NOTE — MR AVS SNAPSHOT
[facility] 37, [address] [facility]  [phone]               Thank you for choosing us for your health care visit with Sara Pack MD.  We are glad to serve you and happy to provide you with this summ activities such as driving, reading, typing, or holding a phone.  Symptoms can include:  · Tingling and numbness in the hand or wrist  · Sharp pain that shoots up the arm or down to the fingers  · Hand stiffness or cramping, especially in the morning  · Tro BP Pulse Temp Height Weight BMI    110/78 mmHg 75 98.1 °F (36.7 °C) (Oral) 58.5\" 124 lb 25.47 kg/m2         Current Medications          This list is accurate as of: 1/31/17  2:42 PM.  Always use your most recent med list.                ADVIL 200 MG Tab Referral Orders      Normal Orders This Visit    DME - EXTERNAL  [531541 CPT(R)]  Order #:  [id number]         **REFERRAL REQUEST**    Your physician has referred you to a specialist.  Your physician or the clinic staff will provide you with the phone numbe [address] [place], [address]    [address] [facility] [address]                                      [place] 30: [phone]    Elisha Leyva 30:  [id number]  [address] in [facility] in [address] [facility], [address] Start activities slowly and build up over time Do what you like   Get your heart pumping – brisk walking, biking, swimming Even 10 minute increments are effective and add up over the week   2 ½ hours per week – spread out over time Use a pipo to keep you